# Patient Record
Sex: FEMALE | Race: WHITE | Employment: OTHER | URBAN - METROPOLITAN AREA
[De-identification: names, ages, dates, MRNs, and addresses within clinical notes are randomized per-mention and may not be internally consistent; named-entity substitution may affect disease eponyms.]

---

## 2022-11-06 ENCOUNTER — HOSPITAL ENCOUNTER (EMERGENCY)
Age: 87
Discharge: HOME | End: 2022-11-06
Attending: EMERGENCY MEDICINE
Payer: MEDICARE

## 2022-11-06 ENCOUNTER — APPOINTMENT (OUTPATIENT)
Dept: GENERAL RADIOLOGY | Age: 87
End: 2022-11-06
Payer: MEDICARE

## 2022-11-06 VITALS
HEART RATE: 70 BPM | WEIGHT: 148.37 LBS | DIASTOLIC BLOOD PRESSURE: 76 MMHG | HEIGHT: 65 IN | OXYGEN SATURATION: 96 % | SYSTOLIC BLOOD PRESSURE: 166 MMHG | BODY MASS INDEX: 24.72 KG/M2 | RESPIRATION RATE: 16 BRPM | TEMPERATURE: 98.4 F

## 2022-11-06 DIAGNOSIS — M25.562 ACUTE PAIN OF LEFT KNEE: ICD-10-CM

## 2022-11-06 DIAGNOSIS — M25.561 ACUTE PAIN OF RIGHT KNEE: Primary | ICD-10-CM

## 2022-11-06 PROCEDURE — 6370000000 HC RX 637 (ALT 250 FOR IP): Performed by: PHYSICIAN ASSISTANT

## 2022-11-06 PROCEDURE — 99283 EMERGENCY DEPT VISIT LOW MDM: CPT

## 2022-11-06 PROCEDURE — 73560 X-RAY EXAM OF KNEE 1 OR 2: CPT

## 2022-11-06 RX ORDER — ACETAMINOPHEN 325 MG/1
650 TABLET ORAL ONCE
Status: COMPLETED | OUTPATIENT
Start: 2022-11-06 | End: 2022-11-06

## 2022-11-06 RX ORDER — LIDOCAINE 4 G/G
1 PATCH TOPICAL ONCE
Status: DISCONTINUED | OUTPATIENT
Start: 2022-11-06 | End: 2022-11-06 | Stop reason: HOSPADM

## 2022-11-06 RX ORDER — LIDOCAINE 4 G/G
1 PATCH TOPICAL DAILY
Qty: 30 PATCH | Refills: 0 | Status: SHIPPED | OUTPATIENT
Start: 2022-11-06 | End: 2022-12-06

## 2022-11-06 RX ORDER — ACETAMINOPHEN 500 MG
500 TABLET ORAL 4 TIMES DAILY PRN
Qty: 60 TABLET | Refills: 0 | Status: SHIPPED | OUTPATIENT
Start: 2022-11-06

## 2022-11-06 RX ADMIN — ACETAMINOPHEN 650 MG: 325 TABLET ORAL at 15:11

## 2022-11-06 ASSESSMENT — PAIN DESCRIPTION - LOCATION
LOCATION: LEG
LOCATION: LEG

## 2022-11-06 ASSESSMENT — PAIN - FUNCTIONAL ASSESSMENT
PAIN_FUNCTIONAL_ASSESSMENT: NONE - DENIES PAIN
PAIN_FUNCTIONAL_ASSESSMENT: 0-10

## 2022-11-06 ASSESSMENT — PAIN DESCRIPTION - PAIN TYPE: TYPE: ACUTE PAIN

## 2022-11-06 ASSESSMENT — PAIN DESCRIPTION - ORIENTATION
ORIENTATION: RIGHT
ORIENTATION: RIGHT

## 2022-11-06 ASSESSMENT — PAIN SCALES - GENERAL
PAINLEVEL_OUTOF10: 0
PAINLEVEL_OUTOF10: 3
PAINLEVEL_OUTOF10: 0

## 2022-11-06 ASSESSMENT — PAIN DESCRIPTION - DESCRIPTORS: DESCRIPTORS: PATIENT UNABLE TO DESCRIBE

## 2022-11-06 NOTE — ED PROVIDER NOTES
[address]  [address] [address]  Dept: [phone]  Loc: [address] ENCOUNTER      This patient was seen and evaluated by the attending physician Dr Lyssa Gilmore. CHIEF COMPLAINT    Chief Complaint   Patient presents with    Leg Pain     Pt c/o right leg pain since last night. Denies known injury. Ambulated to [place] [id number] with ease. Normally uses walker. HPI    Justus Bryan is a 80 y.o. female who presents with right knee pain, and to some degree left knee pain. Onset was yesterday. The duration has been constant since the onset. The patient resides at an [facility] and comes in today because she had a difficult time walking due to the discomfort. The context was that she may have fallen or stepped incorrectly - she cannot provide accurate history due to dementia - but her daughter Leobardo Wang is with her and reports that she is mentating at baseline and is not suspicious for a fall. The pain worsens with ambulation and certain movements. There are no further complaints. REVIEW OF SYSTEMS    General: No fever or chills  Skin: No Rashes or redness of the skin  Musculoskeletal: See HPI    PAST MEDICAL & SURGICAL HISTORY    History reviewed. No pertinent past medical history. History reviewed. No pertinent surgical history. CURRENT MEDICATIONS  (may include discharge medications prescribed in the ED)  Current Outpatient Rx   Medication Sig Dispense Refill    acetaminophen (TYLENOL) 500 MG tablet Take 1 tablet by mouth 4 times daily as needed for Pain 60 tablet 0    lidocaine 4 % external patch Place 1 patch onto the skin daily 30 patch 0       ALLERGIES    No Known Allergies    SOCIAL & FAMILY HISTORY    Social History     Socioeconomic History    Marital status:       Spouse name: None    Number of children: None    Years of education: None    Highest education level: None   Tobacco Use Smoking status: Never    Smokeless tobacco: Never   Vaping Use    Vaping Use: Never used   Substance and Sexual Activity    Alcohol use: Not Currently    Drug use: Never     History reviewed. No pertinent family history. PHYSICAL EXAM    VITAL SIGNS: BP (!) 166/76   Pulse 70   Temp 98.4 °F (36.9 °C) (Oral)   Resp 16   Ht 5' 5\" (1.651 m)   Wt 148 lb 5.9 oz (67.3 kg)   SpO2 96%   BMI 24.69 kg/m²   Constitutional:  Well developed, no acute distress  HENT:  Atraumatic, Moist mucous membranes  Neck: normal range of motion, supple   Respiratory:  No retractions   Cardiovascular:  No JVD   Musculoskeletal:  Exam of the affected knee reveals medial knee tenderness, mild swelling, no erythema or deformity. Extensor mechanism is intact (Patient is able to extend the leg against gravity, demonstrating that the quadriceps tendon and the patella tendon are intact.) Hollers with pain with flexion and movement back to extension, points to her medial right knee as the source of the pain. No distal swelling. NVS intact distally. Left knee with no tenderness on exam, midline scar noted. Hips stable, nontender bilaterally. Pt does ambulate in ED. Vascular: DP pulses 2+ on the same side as the knee pain (distal to the affected knee). Integument:  Well hydrated, no erythema or warmth of the affected knee  Neurologic:  Awake and alert, no slurred speech   Psychiatric: Cooperative, pleasant affect    RADIOLOGY/PROCEDURES    XR KNEE LEFT (1-2 VIEWS)   Final Result   1. No acute findings in the knees. 2. No evident complication associated with left knee medial compartment   hemiarthroplasty. 3. Bilateral knee osteoarthritic changes as above with a possible   intra-articular loose body in the right anterior joint recess. 4. Trace to small right and at most trace left suprapatellar effusions. XR KNEE RIGHT (1-2 VIEWS)   Final Result   1. No acute findings in the knees.    2. No evident complication associated with left knee medial compartment   hemiarthroplasty. 3. Bilateral knee osteoarthritic changes as above with a possible   intra-articular loose body in the right anterior joint recess. 4. Trace to small right and at most trace left suprapatellar effusions. ED COURSE & MEDICAL DECISION MAKING    Pertinent Imaging studies reviewed and interpreted. (See EMR for details)  See electronic record for medications ordered. Ace wrap ordered. Differential diagnosis: includes but not limited to Meniscus tear, ACL tear, tibial plateau fracture, extensor mechanism rupture, dislocation, Arterial Injury/Ischemia, Infection, Neurologic Deficit/Injury. No evidence of neurovascular injury on exam.  Plain films as above with no acute findings in the knees, no complication with the left hemiarthroplasty, possible intra-articular loose body in the right anterior joint recess, and trace to small on the right and trace suprapatellar effusions on the left. I explained to the patient that there may be ligamentous/meniscal injury not seen on plain films, and that they will require follow-up with orthopedics for further evaluation. I believe the patient is safe for discharge at this time. I'll prescribe oral analgesics and provide orthopedic referral.    The patient was instructed to follow up as an outpatient in 3 days. The patient was instructed to return to the ED immediately for any new or worsening symptoms. The patient verbalized understanding. FINAL IMPRESSION    1. Acute pain of right knee    2.  Acute pain of left knee        PLAN  Discharge with close outpatient follow-up (see EMR)     (Please note that this note was completed with a voice recognition program.  Every attempt was made to edit the dictations, but inevitably there remain words that are mis-transcribed.)       Leola Acevedoma  11/06/22 1556

## 2022-11-06 NOTE — ED TRIAGE NOTES
Pt c/o right leg pain since last night. Denies known injury. Ambulated to [place] [address] with ease. Normally uses walker.

## 2022-11-06 NOTE — ED NOTES
Pt denies knee pain at this time. Ace wrap applied to right knee as ordered.      Jozef Fair RN  11/06/22 3018

## 2022-11-06 NOTE — ED NOTES
Pt discharged at this time. Discharge instructions and medications reviewed,  Questions were answered. PT verbalized understanding. VSS, Afebrile. Follow up appointments were discussed.            Cynthia Bustos RN  11/06/22 [id number]

## 2022-11-06 NOTE — ED PROVIDER NOTES
Patient seen and examined discussed with MLP agree with plan. Briefly this is a 75-year-old  female with intermittent right knee pain since last night. She has no acute fracture or traumatic abnormality on her films and her knee exam is fairly benign. She is able to use her walker without significant difficulty. This is likely degenerative changes are from an old injury and she can follow-up in outpatient basis.      Dex Garzon MD  11/06/22 5143

## 2022-11-07 ENCOUNTER — TELEPHONE (OUTPATIENT)
Dept: ORTHOPEDIC SURGERY | Age: 87
End: 2022-11-07

## 2022-11-07 NOTE — TELEPHONE ENCOUNTER
Left VM for Pt to please call and make an ED f/u for bilateral knee OA  with  [facility].  Please offer [facility]

## 2022-11-10 ENCOUNTER — OFFICE VISIT (OUTPATIENT)
Dept: ORTHOPEDIC SURGERY | Age: 87
End: 2022-11-10
Payer: MEDICARE

## 2022-11-10 VITALS — HEIGHT: 65 IN | RESPIRATION RATE: 18 BRPM | BODY MASS INDEX: 24.66 KG/M2 | WEIGHT: 148 LBS

## 2022-11-10 DIAGNOSIS — M25.561 PAIN IN BOTH KNEES, UNSPECIFIED CHRONICITY: Primary | ICD-10-CM

## 2022-11-10 DIAGNOSIS — M17.0 PRIMARY OSTEOARTHRITIS OF BOTH KNEES: ICD-10-CM

## 2022-11-10 DIAGNOSIS — M17.11 ARTHRITIS OF RIGHT KNEE: ICD-10-CM

## 2022-11-10 DIAGNOSIS — M25.562 PAIN IN BOTH KNEES, UNSPECIFIED CHRONICITY: Primary | ICD-10-CM

## 2022-11-10 PROCEDURE — 20610 DRAIN/INJ JOINT/BURSA W/O US: CPT | Performed by: PHYSICIAN ASSISTANT

## 2022-11-10 PROCEDURE — 1123F ACP DISCUSS/DSCN MKR DOCD: CPT | Performed by: PHYSICIAN ASSISTANT

## 2022-11-10 PROCEDURE — 99203 OFFICE O/P NEW LOW 30 MIN: CPT | Performed by: PHYSICIAN ASSISTANT

## 2022-11-10 RX ORDER — BUPIVACAINE HYDROCHLORIDE 2.5 MG/ML
2 INJECTION, SOLUTION INFILTRATION; PERINEURAL ONCE
Status: COMPLETED | OUTPATIENT
Start: 2022-11-10 | End: 2022-11-10

## 2022-11-10 RX ORDER — TRIAMCINOLONE ACETONIDE 40 MG/ML
40 INJECTION, SUSPENSION INTRA-ARTICULAR; INTRAMUSCULAR ONCE
Status: COMPLETED | OUTPATIENT
Start: 2022-11-10 | End: 2022-11-10

## 2022-11-10 RX ADMIN — TRIAMCINOLONE ACETONIDE 40 MG: 40 INJECTION, SUSPENSION INTRA-ARTICULAR; INTRAMUSCULAR at 14:23

## 2022-11-10 RX ADMIN — BUPIVACAINE HYDROCHLORIDE 5 MG: 2.5 INJECTION, SOLUTION INFILTRATION; PERINEURAL at 14:23

## 2022-11-11 NOTE — PROGRESS NOTES
Subjective:      Patient ID: Dione Spann is a 80 y.o.  female who presents to the office with her daughter with a new complaint of  right and left knee pain. Onset of symptoms weeks. These symptoms have been progressive in nature. History of injury-none. History of prior surgery-left knee hemiarthroplasty several years ago  Pain is right knee 5/10 VAS: Left knee 3/10 VAS. Location of pain-global right and left knee. Pain is worse with weight bearing activity. Pain improves with rest and elevation. Length of ambulation is typically affected by the knee pain. Previous treatments have included-Tylenol. Family history for osteoarthritis-positive. Review of Systems:  I have reviewed the clinically relevant past medical history, medications, allergies, family history, social history, and 13 point Review of Systems from the patient's recent history form & documented any details relevant to today's presenting complaints in the history above. The patient's self-reported past medical history, medications, allergies, family history, social history, and Review of Systems form from today's date have been scanned into the chart under the \"Media\" tab. History reviewed. No pertinent past medical history. History reviewed. No pertinent family history.     Past Surgical History:   Procedure Laterality Date    PARTIAL KNEE ARTHROPLASTY Left        Social History     Occupational History    Not on file   Tobacco Use    Smoking status: Never    Smokeless tobacco: Never   Vaping Use    Vaping Use: Never used   Substance and Sexual Activity    Alcohol use: Not Currently    Drug use: Never    Sexual activity: Not on file       Current Outpatient Medications   Medication Sig Dispense Refill    acetaminophen (TYLENOL) 500 MG tablet Take 1 tablet by mouth 4 times daily as needed for Pain 60 tablet 0    lidocaine 4 % external patch Place 1 patch onto the skin daily 30 patch 0     No current facility-administered medications for this visit. Objective:     She is alert, oriented x 3, pleasant, well nourished, developed and in no acute distress. Resp 18   Ht 5' 5\" (1.651 m)   Wt 148 lb (67.1 kg)   BMI 24.63 kg/m²      Examination of the right knee: Inspection of the skin and soft tissues reveals no erythema. The overall alignment of the knee is mild varus. Gait is antalgic. There is mild intra-articular effusion. ROM:              Extension-         5                Flexion -           120                There is pain associated with ROM testing. There is tenderness to palpation over the medial joint line. Popliteal fossa is not tender to palpation. Varus Stress testing negative for instability. Valgus Stress testing negative for instability. Patellar Compression testing positive for pain or crepitus. Extensor Mechanism is intact. Examination of the left knee: Inspection of the skin demonstrates a well-healed midline incision. Inspection of the soft tissues without significant swelling or erythema. The overall alignment of the knee is neutral.  There is minimal intra-articular effusion. AROM     Extension 0     Flexion  115   There mild pain associated with ROM testing. Pes anserine bursa non-tender to palpation. Patellar tendon non-tender to palpation. Quadriceps tendon non-tender to palpation. Collateral ligaments non-tender to palpation. Popliteal fossa non-tender to palpation. Retro patellar crepitus is present. There is no instability with varus/valgus stress testing in full extension or 90 degrees of flexion. There is no instability with anterior drawer testing. Extensor Mechanism is intact. Lower extremities:  She has 5/5 motor strength of bilateral lower extremities. She has a negative straight leg raise, bilaterally. Deep tendon reflexes at knees and achilles are 2+. Sensation is intact to light touch L3 to S1 bilaterally.    She has no clonus. Examination of the lower extremities shows intact perfusion to both lower extremities. She has no cyanosis and digigts are warm to touch, capillary refill is less than 2 seconds. She has no edema noted. She has intact skin without lacerations or abrasions, no significant erythema, rashes or skin lesions. X Rays: performed in the office today:   AP and PA standing, lateral and sunrise views of right knee:   No acute fractures or dislocations noted. Knee x-rays demonstrate osteoarthritis. Medial compartment-    4/4 Kellgren and Jassi Classification. Lateral compartment-    3/4 Kellgren and Jassi Classification. PF compartment-          3/4 Kellgren and Jassi Classification. AP Standing, Lateral and Sunrise left Knee: There is a left medial david-arthroplasty present. The alignment is satisfactory. There are no signs of failure, dislocation or loosening. Moderate degenerative changes of the lateral and moderate to advanced degenerative changes of the patellofemoral compartment. Diagnosis:      ICD-10-CM    1. Pain in both knees, unspecified chronicity  M25.561 XR KNEE LEFT (3 VIEWS)    M25.562 XR KNEE RIGHT (3 VIEWS)      2. Primary osteoarthritis of both knees  M17.0       3. Arthritis of right knee  M17.11 MD ARTHROCENTESIS ASPIR&/INJ MAJOR JT/BURSA W/O US     bupivacaine (MARCAINE) 0.25 % injection 5 mg     triamcinolone acetonide (KENALOG-40) injection 40 mg           Assessment/ Plan:       Assessment:  Right knee arthritis causing significant discomfort when attempting to ambulate. History of left medial hemiarthroplasty with lateral and patellofemoral arthritis causing mild discomfort. I had an extensive discussion with Ms. Kenneth Gabriel and/or family regarding the natural history, etiology, and long term consequences of her condition. I have presented reasonable alternatives to the patient's proposed care, treatment, and services.   Risks and benefits of the treatment options also reviewed in detail. I have outlined a treatment plan with them. She has had full opportunity to ask her questions. I have answered them all to her satisfaction. I feel that Ms. Magda Julien understands our discussion today. Discussed at length conservative treatment of knee symptoms/arthritis, according to AAOS Clinical Practice Guidelines:  1)  Recommend oral or topical NSAIDs to reduce pain and swelling. 2)  Recommend acetaminophen to reduce pain. 3)  Oral narcotics, including tramadol, result in a significant increase of adverse events and are not effective at improving pain or function for treatment of osteoarthritis of the knee. 4)  Recommend maintaining weight in a healthy range to reduce stresses on the knee, particularly the patellofemoral compartment which sees up to 6x body weight. 5)  Home exercise program, focusing on strengthening, low impact aerobic exercises, and neuromuscular education. 6)  Intra-articular corticosteroid injections are an option. Informed patient corticosteroid injections can be performed every 3-4 months as needed. 7)  Evidence for intra-articular hyaluronic acid injections (viscosupplementation) is not as strong, but may benefit a subset of patients who have failed other options. Informed patient viscosupplementation can be performed every 6 months as needed. 8)  Bracing and cane use can improve pain and function. Although not specifically listed in the CPGs, ice therapy and topical patches such as Salonpas are good options as well. Other non-surgical options including Coolief (cooled frequency ablation of the geniculate nerves), stem cell injections, PRP injections were discussed. Surgical option, knee arthroplasty discussed. Plan:  Medications-   NSAIDS discussed. The most common side effects from NSAIDs are stomachaches, heartburn, and nausea. NSAIDs may irritate the stomach lining.  If the medicine upsets your stomach, you can try taking it with food. But if that doesn't help, talk with your doctor to make sure it's not a more serious problem, such as a stomach ulcer or bleeding in the stomach or intestines. Using NSAIDs may:  Lead to high blood pressure. Make symptoms of heart failure worse. Raise the risk of heart attack, stroke, kidney damage, and skin reactions. Your risks are greater if you take NSAIDs at higher doses or for longer than the label says. People who are older than 72 or who have heart, stomach, or intestinal disease have a higher risk for problems. PT-   A home exercise program was instructed today including ROM exercises and strengthening exercises. The patient verbalized understanding of these exercises as well as the importance of the exercise program to promote return of normal function. If pain intensifies or other problems arise you are to notify the office. Procedures-   The Risks and benefits of corticosteroid intra-articular injections were discussed today. All questions were answered to her satisfaction. She verbally consented to proceed with intra-articular injection today. Cortisone Injection                                                       PROCEDURE NOTE:  Diagnosis:  Right knee pain due to arthritis. With [facility] permission, her right knee was prepped  in standard sterile fashion with  Alcohol. 2 cc of 0.25% Marcaine and 1 cc of Kenalog 40 mg was injected into the right lateral compartment  without difficulty. She tolerated this well without difficulty. A band-aid was applied. She was advised to ice the right knee for 15-20 minutes to relieve any injection site related pain. Follow up-  Call or return to clinic if these symptoms worsen or fail to improve as anticipated.                                                    Marleni Brownlee PA-C   Senior Physician Assistant   [facility] Disclaimer: This note was generated with use of a verbal recognition program (DRAGON) and an attempt was made to check for errors. It is possible that there are still dictated errors within this office note. If so, please bring any significant errors to my attention for an addendum. All efforts were made to ensure that this office note is accurate.